# Patient Record
Sex: MALE | Race: WHITE | NOT HISPANIC OR LATINO | Employment: OTHER | URBAN - METROPOLITAN AREA
[De-identification: names, ages, dates, MRNs, and addresses within clinical notes are randomized per-mention and may not be internally consistent; named-entity substitution may affect disease eponyms.]

---

## 2017-06-18 PROBLEM — F10.939 ALCOHOL WITHDRAWAL: Status: ACTIVE | Noted: 2017-06-18

## 2017-06-19 PROBLEM — D69.6 THROMBOCYTOPENIA: Status: ACTIVE | Noted: 2017-06-19

## 2017-06-19 PROBLEM — F11.93 OPIOID WITHDRAWAL: Status: ACTIVE | Noted: 2017-06-19

## 2017-06-19 PROBLEM — E83.42 HYPOMAGNESEMIA: Status: ACTIVE | Noted: 2017-06-19

## 2017-06-19 PROBLEM — E87.1 HYPONATREMIA: Status: ACTIVE | Noted: 2017-06-19

## 2017-06-19 PROBLEM — R79.89 ELEVATED LFTS: Status: ACTIVE | Noted: 2017-06-19

## 2017-06-19 PROBLEM — R82.90 ABNORMAL URINALYSIS: Status: ACTIVE | Noted: 2017-06-19

## 2017-06-19 PROBLEM — E87.6 HYPOKALEMIA: Status: ACTIVE | Noted: 2017-06-19

## 2017-06-20 PROBLEM — E87.1 HYPONATREMIA: Status: RESOLVED | Noted: 2017-06-19 | Resolved: 2017-06-20

## 2017-06-20 PROBLEM — E83.42 HYPOMAGNESEMIA: Status: RESOLVED | Noted: 2017-06-19 | Resolved: 2017-06-20

## 2017-10-22 PROBLEM — R63.4 WEIGHT LOSS: Status: ACTIVE | Noted: 2017-10-22

## 2017-10-22 PROBLEM — R11.0 NAUSEA: Status: ACTIVE | Noted: 2017-10-22

## 2017-10-22 PROBLEM — R07.9 CHEST PAIN: Status: ACTIVE | Noted: 2017-10-22

## 2017-10-22 PROBLEM — R63.0 ANOREXIA: Status: ACTIVE | Noted: 2017-10-22

## 2017-12-19 ENCOUNTER — HOSPITAL ENCOUNTER (OUTPATIENT)
Dept: PREADMISSION TESTING | Facility: OTHER | Age: 42
Discharge: HOME | End: 2017-12-19
Attending: OTOLARYNGOLOGY
Payer: MEDICARE

## 2017-12-19 ENCOUNTER — ANESTHESIA EVENT (OUTPATIENT)
Dept: SURGERY | Facility: OTHER | Age: 42
End: 2017-12-19
Payer: MEDICARE

## 2017-12-19 VITALS
OXYGEN SATURATION: 98 % | DIASTOLIC BLOOD PRESSURE: 92 MMHG | BODY MASS INDEX: 28.88 KG/M2 | HEART RATE: 77 BPM | RESPIRATION RATE: 16 BRPM | SYSTOLIC BLOOD PRESSURE: 136 MMHG | TEMPERATURE: 98 F | HEIGHT: 69 IN | WEIGHT: 195 LBS

## 2017-12-19 RX ORDER — LIDOCAINE HYDROCHLORIDE 10 MG/ML
1 INJECTION, SOLUTION EPIDURAL; INFILTRATION; INTRACAUDAL; PERINEURAL ONCE
Status: CANCELLED | OUTPATIENT
Start: 2017-12-19 | End: 2017-12-19

## 2017-12-19 RX ORDER — PREGABALIN 75 MG/1
150 CAPSULE ORAL
Status: DISCONTINUED | OUTPATIENT
Start: 2017-12-20 | End: 2017-12-20 | Stop reason: HOSPADM

## 2017-12-19 RX ORDER — SODIUM CHLORIDE, SODIUM LACTATE, POTASSIUM CHLORIDE, CALCIUM CHLORIDE 600; 310; 30; 20 MG/100ML; MG/100ML; MG/100ML; MG/100ML
INJECTION, SOLUTION INTRAVENOUS CONTINUOUS
Status: CANCELLED | OUTPATIENT
Start: 2017-12-19

## 2017-12-19 RX ORDER — FAMOTIDINE 20 MG/1
20 TABLET, FILM COATED ORAL
Status: CANCELLED | OUTPATIENT
Start: 2017-12-19 | End: 2017-12-19

## 2017-12-19 RX ORDER — MIDAZOLAM HYDROCHLORIDE 5 MG/ML
5 INJECTION INTRAMUSCULAR; INTRAVENOUS
Status: DISCONTINUED | OUTPATIENT
Start: 2017-12-20 | End: 2017-12-20 | Stop reason: HOSPADM

## 2017-12-19 NOTE — ANESTHESIA PREPROCEDURE EVALUATION
12/19/2017  Harman Tanner Jr. is a 42 y.o., male.    Anesthesia Evaluation    I have reviewed the Patient Summary Reports.    I have reviewed the Nursing Notes.   I have reviewed the Medications.     Review of Systems  Anesthesia Hx:  No problems with previous Anesthesia  History of prior surgery of interest to airway management or planning: cervical fusion. Previous anesthesia: General Mult prev GA NAAC with general anesthesia.  Denies Family Hx of Anesthesia complications.   Denies Personal Hx of Anesthesia complications.   Social:  Non-Smoker    Hematology/Oncology:  Hematology Normal   Oncology Normal     EENT/Dental:EENT/Dental Normal   Cardiovascular:   Exercise tolerance: good    Pulmonary:  Pulmonary Normal    Renal/:  Renal/ Normal     Hepatic/GI:   GERD, well controlled    Musculoskeletal:   Arthritis   Spine Disorders: cervical and lumbar Degenerative disease, Disc disease and Chronic Pain    Neurological:   Seizures No seizure in 10 yrs   Endocrine:  Endocrine Normal    Dermatological:  Skin Normal    Psych:   Psychiatric History          Physical Exam  General:  Well nourished    Airway/Jaw/Neck:  Airway Findings: Mouth Opening: Normal Tongue: Normal  Mallampati: II     Eyes/Ears/Nose:  Eyes/Ears/Nose Findings: FX orbit    Dental:  Dental Findings: Periodontal disease, Severe, molar caps         Mental Status:  Mental Status Findings:  Cooperative, Alert and Oriented         Anesthesia Plan  Type of Anesthesia, risks & benefits discussed:  Anesthesia Type:  general  Patient's Preference:   Intra-op Monitoring Plan:   Intra-op Monitoring Plan Comments:   Post Op Pain Control Plan:   Post Op Pain Control Plan Comments:   Induction:   IV  Beta Blocker:         Informed Consent: Patient understands risks and agrees with Anesthesia plan.  Questions answered. Anesthesia consent signed  with patient.  ASA Score: 2     Day of Surgery Review of History & Physical:    H&P update referred to the surgeon.     Anesthesia Plan Notes: Labs in epic,awful teeth,metal in upper front bad        Ready For Surgery From Anesthesia Perspective.

## 2017-12-19 NOTE — DISCHARGE INSTRUCTIONS
PRE-ADMIT TESTING -  [phone]    [address]  [place] [facility]          Your surgery has been scheduled at [facility]. We are pleased to have the opportunity to serve you. For Further Information please call [phone].    On the day of surgery please report to the Information Desk on the 1st floor.    · CONTACT YOUR PHYSICIAN'S OFFICE THE DAY PRIOR TO YOUR SURGERY TO OBTAIN YOUR ARRIVAL TIME.     · The evening before surgery do not eat anything after 9 p.m. ( this includes hard candy, chewing gum and mints).  You may only have GATORADE, POWERADE AND WATER  from 9 p.m. until you leave your home.   DO NOT DRINK ANY LIQUIDS ON THE WAY TO THE HOSPITAL.      SPECIAL MEDICATION INSTRUCTIONS: TAKE medications checked off by the Anesthesiologist on your Medication List.    Angiogram Patients: Take medications as instructed by your physician, including aspirin.     Surgery Patients:    If you take ASPIRIN - Your PHYSICIAN/SURGEON will need to inform you IF/OR when you need to stop taking aspirin prior to your surgery.     Do Not take any medications containing IBUPROFEN.  Do Not Wear any make-up or dark nail polish   (especially eye make-up) to surgery. If you come to surgery with makeup on you will be required to remove the makeup or nail polish.    Do not shave your surgical area at least 5 days prior to your surgery. The surgical prep will be performed at the hospital according to Infection Control regulations.    Leave all valuables at home.   Do Not wear any jewelry or watches, including any metal in body piercings.  Contact Lens must be removed before surgery. Either do not wear the contact lens or bring a case and solution for storage.  Please bring a container for eyeglasses or dentures as required.  Bring any paperwork your physician has provided, such as consent forms,  history and physicals, doctor's orders, etc.   Bring comfortable clothes that are loose fitting to wear upon  discharge. Take into consideration the type of surgery being performed.  Maintain your diet as advised per your physician the day prior to surgery.      Adequate rest the night before surgery is advised.   Park in the Parking lot behind the hospital or in the [facility] across the street from the parking lot. Parking is complimentary.  If you will be discharged the same day as your procedure, please arrange for a responsible adult to drive you home or to accompany you if traveling by taxi.   YOU WILL NOT BE PERMITTED TO DRIVE OR TO LEAVE THE HOSPITAL ALONE AFTER SURGERY.   It is strongly recommended that you arrange for someone to remain with you for the first 24 hrs following your surgery.       Thank you for your cooperation.  The Staff of [facility].        Bathing Instructions                                                                 Please shower the evening before and morning of your procedure with    ANTIBACTERIAL SOAP. ( DIAL, etc )  Concentrate on the surgical area   for at least 3 minutes and rinse completely. Dry off as usual.   Do not use any deodorant, powder, body lotions, perfume, after shave or    cologne.

## 2017-12-20 ENCOUNTER — ANESTHESIA (OUTPATIENT)
Dept: SURGERY | Facility: OTHER | Age: 42
End: 2017-12-20
Payer: MEDICARE

## 2017-12-20 ENCOUNTER — HOSPITAL ENCOUNTER (OUTPATIENT)
Facility: OTHER | Age: 42
Discharge: HOME | End: 2017-12-20
Attending: OTOLARYNGOLOGY | Admitting: OTOLARYNGOLOGY
Payer: MEDICARE

## 2017-12-20 VITALS
SYSTOLIC BLOOD PRESSURE: 137 MMHG | DIASTOLIC BLOOD PRESSURE: 96 MMHG | TEMPERATURE: 99 F | OXYGEN SATURATION: 99 % | WEIGHT: 195 LBS | RESPIRATION RATE: 16 BRPM | BODY MASS INDEX: 28.88 KG/M2 | HEART RATE: 79 BPM | HEIGHT: 69 IN

## 2017-12-20 DIAGNOSIS — S02.32XG: Primary | ICD-10-CM

## 2017-12-20 DIAGNOSIS — S02.32XA: ICD-10-CM

## 2017-12-20 PROCEDURE — 71000033 HC RECOVERY, INTIAL HOUR: Performed by: OTOLARYNGOLOGY

## 2017-12-20 PROCEDURE — 63600175 PHARM REV CODE 636 W HCPCS: Performed by: NURSE ANESTHETIST, CERTIFIED REGISTERED

## 2017-12-20 PROCEDURE — 63600175 PHARM REV CODE 636 W HCPCS: Performed by: OTOLARYNGOLOGY

## 2017-12-20 PROCEDURE — 25000003 PHARM REV CODE 250: Performed by: NURSE ANESTHETIST, CERTIFIED REGISTERED

## 2017-12-20 PROCEDURE — 27201423 OPTIME MED/SURG SUP & DEVICES STERILE SUPPLY: Performed by: OTOLARYNGOLOGY

## 2017-12-20 PROCEDURE — 36000711: Performed by: OTOLARYNGOLOGY

## 2017-12-20 PROCEDURE — 71000039 HC RECOVERY, EACH ADD'L HOUR: Performed by: OTOLARYNGOLOGY

## 2017-12-20 PROCEDURE — 25000003 PHARM REV CODE 250: Performed by: OTOLARYNGOLOGY

## 2017-12-20 PROCEDURE — C1713 ANCHOR/SCREW BN/BN,TIS/BN: HCPCS | Performed by: OTOLARYNGOLOGY

## 2017-12-20 PROCEDURE — 71000015 HC POSTOP RECOV 1ST HR: Performed by: OTOLARYNGOLOGY

## 2017-12-20 PROCEDURE — 37000009 HC ANESTHESIA EA ADD 15 MINS: Performed by: OTOLARYNGOLOGY

## 2017-12-20 PROCEDURE — 25000003 PHARM REV CODE 250: Performed by: ANESTHESIOLOGY

## 2017-12-20 PROCEDURE — 37000008 HC ANESTHESIA 1ST 15 MINUTES: Performed by: OTOLARYNGOLOGY

## 2017-12-20 PROCEDURE — 36000710: Performed by: OTOLARYNGOLOGY

## 2017-12-20 PROCEDURE — 63600175 PHARM REV CODE 636 W HCPCS: Performed by: ANESTHESIOLOGY

## 2017-12-20 DEVICE — IMPLANTABLE DEVICE: Type: IMPLANTABLE DEVICE | Site: FACE | Status: FUNCTIONAL

## 2017-12-20 RX ORDER — OXYCODONE HYDROCHLORIDE 5 MG/1
5 TABLET ORAL
Status: DISCONTINUED | OUTPATIENT
Start: 2017-12-20 | End: 2017-12-20 | Stop reason: HOSPADM

## 2017-12-20 RX ORDER — ACETAMINOPHEN 10 MG/ML
INJECTION, SOLUTION INTRAVENOUS
Status: DISCONTINUED | OUTPATIENT
Start: 2017-12-20 | End: 2017-12-20

## 2017-12-20 RX ORDER — LIDOCAINE HYDROCHLORIDE AND EPINEPHRINE 10; 10 MG/ML; UG/ML
INJECTION, SOLUTION INFILTRATION; PERINEURAL
Status: DISCONTINUED | OUTPATIENT
Start: 2017-12-20 | End: 2017-12-20 | Stop reason: HOSPADM

## 2017-12-20 RX ORDER — HYDROCODONE BITARTRATE AND ACETAMINOPHEN 5; 325 MG/1; MG/1
2 TABLET ORAL EVERY 6 HOURS PRN
Qty: 30 TABLET | Refills: 0 | Status: SHIPPED | OUTPATIENT
Start: 2017-12-20 | End: 2020-06-20

## 2017-12-20 RX ORDER — SODIUM CHLORIDE 0.9 % (FLUSH) 0.9 %
3 SYRINGE (ML) INJECTION
Status: DISCONTINUED | OUTPATIENT
Start: 2017-12-20 | End: 2017-12-20 | Stop reason: HOSPADM

## 2017-12-20 RX ORDER — CEPHALEXIN 500 MG/1
500 CAPSULE ORAL
Qty: 30 CAPSULE | Refills: 1 | Status: SHIPPED | OUTPATIENT
Start: 2017-12-20 | End: 2017-12-30

## 2017-12-20 RX ORDER — FAMOTIDINE 20 MG/1
20 TABLET, FILM COATED ORAL
Status: COMPLETED | OUTPATIENT
Start: 2017-12-20 | End: 2017-12-20

## 2017-12-20 RX ORDER — ONDANSETRON 8 MG/1
8 TABLET, ORALLY DISINTEGRATING ORAL EVERY 8 HOURS PRN
Qty: 6 TABLET | Refills: 1 | Status: SHIPPED | OUTPATIENT
Start: 2017-12-20 | End: 2018-10-07

## 2017-12-20 RX ORDER — BACITRACIN ZINC AND POLYMYXIN B SULFATE 500; 10000 [USP'U]/G; [USP'U]/G
OINTMENT OPHTHALMIC 3 TIMES DAILY
Qty: 1 TUBE | Refills: 2 | Status: SHIPPED | OUTPATIENT
Start: 2017-12-20 | End: 2018-10-07

## 2017-12-20 RX ORDER — NEOSTIGMINE METHYLSULFATE 1 MG/ML
INJECTION, SOLUTION INTRAVENOUS
Status: DISCONTINUED | OUTPATIENT
Start: 2017-12-20 | End: 2017-12-20

## 2017-12-20 RX ORDER — MIDAZOLAM HYDROCHLORIDE 1 MG/ML
INJECTION INTRAMUSCULAR; INTRAVENOUS
Status: DISCONTINUED | OUTPATIENT
Start: 2017-12-20 | End: 2017-12-20

## 2017-12-20 RX ORDER — GLYCOPYRROLATE 0.2 MG/ML
INJECTION INTRAMUSCULAR; INTRAVENOUS
Status: DISCONTINUED | OUTPATIENT
Start: 2017-12-20 | End: 2017-12-20

## 2017-12-20 RX ORDER — FENTANYL CITRATE 50 UG/ML
INJECTION, SOLUTION INTRAMUSCULAR; INTRAVENOUS
Status: DISCONTINUED | OUTPATIENT
Start: 2017-12-20 | End: 2017-12-20

## 2017-12-20 RX ORDER — LIDOCAINE HCL/PF 100 MG/5ML
SYRINGE (ML) INTRAVENOUS
Status: DISCONTINUED | OUTPATIENT
Start: 2017-12-20 | End: 2017-12-20

## 2017-12-20 RX ORDER — KETOROLAC TROMETHAMINE 30 MG/ML
INJECTION, SOLUTION INTRAMUSCULAR; INTRAVENOUS
Status: DISCONTINUED | OUTPATIENT
Start: 2017-12-20 | End: 2017-12-20

## 2017-12-20 RX ORDER — DEXAMETHASONE SODIUM PHOSPHATE 4 MG/ML
INJECTION, SOLUTION INTRA-ARTICULAR; INTRALESIONAL; INTRAMUSCULAR; INTRAVENOUS; SOFT TISSUE
Status: DISCONTINUED | OUTPATIENT
Start: 2017-12-20 | End: 2017-12-20

## 2017-12-20 RX ORDER — FENTANYL CITRATE 50 UG/ML
25 INJECTION, SOLUTION INTRAMUSCULAR; INTRAVENOUS EVERY 5 MIN PRN
Status: COMPLETED | OUTPATIENT
Start: 2017-12-20 | End: 2017-12-20

## 2017-12-20 RX ORDER — HYDROCODONE BITARTRATE AND ACETAMINOPHEN 5; 325 MG/1; MG/1
1 TABLET ORAL EVERY 4 HOURS PRN
Status: DISCONTINUED | OUTPATIENT
Start: 2017-12-20 | End: 2017-12-20 | Stop reason: HOSPADM

## 2017-12-20 RX ORDER — MEPERIDINE HYDROCHLORIDE 50 MG/ML
12.5 INJECTION INTRAMUSCULAR; INTRAVENOUS; SUBCUTANEOUS ONCE AS NEEDED
Status: DISCONTINUED | OUTPATIENT
Start: 2017-12-20 | End: 2017-12-20 | Stop reason: HOSPADM

## 2017-12-20 RX ORDER — ROCURONIUM BROMIDE 10 MG/ML
INJECTION, SOLUTION INTRAVENOUS
Status: DISCONTINUED | OUTPATIENT
Start: 2017-12-20 | End: 2017-12-20

## 2017-12-20 RX ORDER — ONDANSETRON 2 MG/ML
INJECTION INTRAMUSCULAR; INTRAVENOUS
Status: DISCONTINUED | OUTPATIENT
Start: 2017-12-20 | End: 2017-12-20

## 2017-12-20 RX ORDER — ONDANSETRON 2 MG/ML
4 INJECTION INTRAMUSCULAR; INTRAVENOUS DAILY PRN
Status: DISCONTINUED | OUTPATIENT
Start: 2017-12-20 | End: 2017-12-20 | Stop reason: HOSPADM

## 2017-12-20 RX ORDER — ONDANSETRON 8 MG/1
8 TABLET, ORALLY DISINTEGRATING ORAL EVERY 8 HOURS PRN
Status: DISCONTINUED | OUTPATIENT
Start: 2017-12-20 | End: 2017-12-20 | Stop reason: HOSPADM

## 2017-12-20 RX ORDER — HYDROMORPHONE HYDROCHLORIDE 2 MG/ML
0.4 INJECTION, SOLUTION INTRAMUSCULAR; INTRAVENOUS; SUBCUTANEOUS EVERY 5 MIN PRN
Status: DISCONTINUED | OUTPATIENT
Start: 2017-12-20 | End: 2017-12-20 | Stop reason: HOSPADM

## 2017-12-20 RX ORDER — EPINEPHRINE 1 MG/ML
INJECTION, SOLUTION INTRACARDIAC; INTRAMUSCULAR; INTRAVENOUS; SUBCUTANEOUS
Status: DISCONTINUED | OUTPATIENT
Start: 2017-12-20 | End: 2017-12-20 | Stop reason: HOSPADM

## 2017-12-20 RX ORDER — LIDOCAINE HYDROCHLORIDE 10 MG/ML
1 INJECTION, SOLUTION EPIDURAL; INFILTRATION; INTRACAUDAL; PERINEURAL ONCE
Status: DISCONTINUED | OUTPATIENT
Start: 2017-12-20 | End: 2017-12-20 | Stop reason: HOSPADM

## 2017-12-20 RX ORDER — PROPOFOL 10 MG/ML
VIAL (ML) INTRAVENOUS
Status: DISCONTINUED | OUTPATIENT
Start: 2017-12-20 | End: 2017-12-20

## 2017-12-20 RX ORDER — SODIUM CHLORIDE, SODIUM LACTATE, POTASSIUM CHLORIDE, CALCIUM CHLORIDE 600; 310; 30; 20 MG/100ML; MG/100ML; MG/100ML; MG/100ML
INJECTION, SOLUTION INTRAVENOUS CONTINUOUS
Status: DISCONTINUED | OUTPATIENT
Start: 2017-12-20 | End: 2017-12-20 | Stop reason: HOSPADM

## 2017-12-20 RX ADMIN — FENTANYL CITRATE 50 MCG: 50 INJECTION, SOLUTION INTRAMUSCULAR; INTRAVENOUS at 10:12

## 2017-12-20 RX ADMIN — FENTANYL CITRATE 25 MCG: 50 INJECTION, SOLUTION INTRAMUSCULAR; INTRAVENOUS at 11:12

## 2017-12-20 RX ADMIN — CARBOXYMETHYLCELLULOSE SODIUM 2 DROP: 2.5 SOLUTION/ DROPS OPHTHALMIC at 09:12

## 2017-12-20 RX ADMIN — ROCURONIUM BROMIDE 50 MG: 10 INJECTION, SOLUTION INTRAVENOUS at 09:12

## 2017-12-20 RX ADMIN — SODIUM CHLORIDE, SODIUM LACTATE, POTASSIUM CHLORIDE, AND CALCIUM CHLORIDE: 600; 310; 30; 20 INJECTION, SOLUTION INTRAVENOUS at 08:12

## 2017-12-20 RX ADMIN — GLYCOPYRROLATE 0.8 MG: 0.2 INJECTION, SOLUTION INTRAMUSCULAR; INTRAVENOUS at 10:12

## 2017-12-20 RX ADMIN — LIDOCAINE HYDROCHLORIDE 100 MG: 20 INJECTION, SOLUTION INTRAVENOUS at 09:12

## 2017-12-20 RX ADMIN — HYDROMORPHONE HYDROCHLORIDE 0.4 MG: 2 INJECTION INTRAMUSCULAR; INTRAVENOUS; SUBCUTANEOUS at 11:12

## 2017-12-20 RX ADMIN — FENTANYL CITRATE 50 MCG: 50 INJECTION, SOLUTION INTRAMUSCULAR; INTRAVENOUS at 11:12

## 2017-12-20 RX ADMIN — KETOROLAC TROMETHAMINE 30 MG: 30 INJECTION, SOLUTION INTRAMUSCULAR; INTRAVENOUS at 10:12

## 2017-12-20 RX ADMIN — PROPOFOL 200 MG: 10 INJECTION, EMULSION INTRAVENOUS at 09:12

## 2017-12-20 RX ADMIN — HYDROMORPHONE HYDROCHLORIDE 0.4 MG: 2 INJECTION INTRAMUSCULAR; INTRAVENOUS; SUBCUTANEOUS at 12:12

## 2017-12-20 RX ADMIN — MIDAZOLAM HYDROCHLORIDE 2 MG: 1 INJECTION, SOLUTION INTRAMUSCULAR; INTRAVENOUS at 10:12

## 2017-12-20 RX ADMIN — FAMOTIDINE 20 MG: 20 TABLET, FILM COATED ORAL at 07:12

## 2017-12-20 RX ADMIN — DEXAMETHASONE SODIUM PHOSPHATE 8 MG: 4 INJECTION, SOLUTION INTRAMUSCULAR; INTRAVENOUS at 09:12

## 2017-12-20 RX ADMIN — FENTANYL CITRATE 100 MCG: 50 INJECTION, SOLUTION INTRAMUSCULAR; INTRAVENOUS at 09:12

## 2017-12-20 RX ADMIN — DEXTROSE 2 G: 50 INJECTION, SOLUTION INTRAVENOUS at 09:12

## 2017-12-20 RX ADMIN — ONDANSETRON 4 MG: 2 INJECTION INTRAMUSCULAR; INTRAVENOUS at 10:12

## 2017-12-20 RX ADMIN — MIDAZOLAM HYDROCHLORIDE 2 MG: 1 INJECTION, SOLUTION INTRAMUSCULAR; INTRAVENOUS at 09:12

## 2017-12-20 RX ADMIN — ACETAMINOPHEN 1000 MG: 10 INJECTION, SOLUTION INTRAVENOUS at 09:12

## 2017-12-20 RX ADMIN — OXYCODONE HYDROCHLORIDE 5 MG: 5 TABLET ORAL at 11:12

## 2017-12-20 RX ADMIN — NEOSTIGMINE METHYLSULFATE 5 MG: 1 INJECTION INTRAVENOUS at 10:12

## 2017-12-20 NOTE — PLAN OF CARE
Patient prefers to have *[facility] [phone] present for discharge.

## 2017-12-20 NOTE — OR NURSING
Harman Tanner Jr. has met all discharge criteria from Phase II. Vital Signs are stable, ambulating  without difficulty. Discharge instructions given, patient verbalized understanding. Discharged from facility via wheelchair in stable condition.

## 2017-12-20 NOTE — BRIEF OP NOTE
Operative Note     SUMMARY     Surgery Date: 12/20/2017     Surgeon(s) and Role:     * Cedric Mosher MD - Primary     * Silas Robin MD - Assisting    Pre-op Diagnosis:  Closed blow-out fracture of left orbit, initial encounter [S02.32XA]    Post-op Diagnosis: Closed blow-out fracture of left orbit, initial encounter [S02.32XA]    Procedure(s) (LRB):  OPEN REDUCTION INTERNAL FIXATION-ORBITAL FRACTURE (Left)    Anesthesia: General    Findings/Key Components:  Dictated    Estimated Blood Loss: Minimal         Specimens     None            Discharge Note      SUMMARY     Admit Date: 12/20/2017    Attending Physician: Cedric Mosher, *     Discharge Physician: Cedric Mosher, *    Discharge Date: 12/20/2017    Final Diagnosis: Closed blow-out fracture of left orbit, initial encounter [S02.32XA]    Disposition: Home or Self Care    Patient Instructions:   Current Discharge Medication List      START taking these medications    Details   cephALEXin (KEFLEX) 500 MG capsule Take 1 capsule (500 mg total) by mouth 3 (three) times daily with meals.  Qty: 30 capsule, Refills: 1      hydrocodone-acetaminophen 5-325mg (NORCO) 5-325 mg per tablet Take 2 tablets by mouth every 6 (six) hours as needed for Pain.  Qty: 30 tablet, Refills: 0      ondansetron (ZOFRAN-ODT) 8 MG TbDL Take 1 tablet (8 mg total) by mouth every 8 (eight) hours as needed.  Qty: 6 tablet, Refills: 1         CONTINUE these medications which have NOT CHANGED    Details   atropine 1% (ISOPTO ATROPINE) 1 % Drop Place 1 drop into the left eye 2 (two) times daily.  Qty: 5 mL, Refills: 0      clonazePAM (KLONOPIN) 1 MG tablet Take 1 mg by mouth 2 (two) times daily as needed for Anxiety.      diphenhydrAMINE (BENADRYL) 25 mg capsule Take 1 each (25 mg total) by mouth every 6 (six) hours as needed for Itching or Allergies.  Qty: 20 capsule, Refills: 0      famotidine (PEPCID) 20 MG tablet Take 1 tablet (20 mg total) by mouth 2 (two) times  daily.  Qty: 60 tablet, Refills: 0      metoclopramide HCl (REGLAN) 10 MG tablet Take 1 tablet (10 mg total) by mouth every 6 (six) hours as needed (nausea).  Qty: 24 tablet, Refills: 0      ondansetron (ZOFRAN) 4 MG tablet Take 1 tablet (4 mg total) by mouth every 8 (eight) hours as needed.  Qty: 12 tablet, Refills: 0             Discharge Procedure Orders (must include Diet, Follow-up, Activity)    Discharge Procedure Orders (must include Diet, Follow-up, Activity)  Diet general   Order Comments: Advance diet as tolerated.     Activity as tolerated   Order Comments: Sleep head of bed elevated for 72hrs  No heavy lifting  No nose blowing  No strenuous activity  No driving while on pain medications     Call MD for:  temperature >100.4     Call MD for:  persistent nausea and vomiting     Call MD for:  severe uncontrolled pain     Call MD for:  difficulty breathing, headache or visual disturbances     Wound care routine (specify)   Order Comments: Wound care routine: clean incision and apply antibiotic ointment BID

## 2017-12-20 NOTE — DISCHARGE INSTRUCTIONS

## 2017-12-20 NOTE — OP NOTE
DATE OF PROCEDURE:  12/20/2017    PREOPERATIVE DIAGNOSIS:  Left orbital blowout fracture.    POSTOPERATIVE DIAGNOSIS:  Left orbital blowout fracture.    PROCEDURES PERFORMED:  Open repair of left orbital floor fracture.    SURGEON:  Dr. Mosher.    BLOOD LOSS:  Minimal.    COMPLICATIONS:  None.    ANESTHETIC:  General endotracheal.    OPERATIVE FINDINGS:  Large defect left orbital floor, medial and posterior,   repaired with Synthes small preformed titanium plate.    INDICATIONS:  Mr. Tanner is 42 years old who was referred to our office in   [place] for evaluation of orbital blowout fracture.  He was referred by the   ophthalmologist who was found him for significant orbital trauma.  He has a   significant amount of issues with blood in his eye and decreased vision.  In   addition, he had some restriction of motion and some double vision with extreme   gaze.  He had no obvious retraction or restriction of the inferior medial rectus   muscle on physical examination.  However, he did have some significant   enophthalmos and depression of the globe.  It was recommending he undergo a   repair due to significant defect in the floor, which was easily greater than 1   cm in size.  An informed operative consent was obtained.    OPERATIVE PROCEDURE:  He was taken to the operating room, placed under general   anesthesia.  Local anesthetic was infiltrated into the left orbital rim and the   lateral orbit.  Corneal shield was placed on the patient's eye.  A #15 blade was   used to make a subciliary incision along the zygoma, just lateral to the   Crow's-feet line.  The dissection proceeded through the orbicularis muscle down   to the periosteum laterally.  Starting there and transversing medially, skin and   soft tissue muscle flap was elevated with a pair of Quiñonez scissors.  The skin   and soft tissue muscle flap was retracted with eyelid retractors.  Care was   taken to prevent injury of the orbital septum, which was kept  intact.  The   orbital septum was retracted with periosteal elevator.  Dissection proceeded   from lateral to the medial orbit.  At that point, the periosteum was incised   with a #15 blade and under direct visualization using suction magnification and   a [place], the periosteum was elevated along the orbital floor.    The periosteum was elevated laterally and posteriorly along normal orbital floor   bone.  Then, it was carried medially and posteriorly to find stable bone, which   was found at about 31 mm deep.  The herniated contents were then carefully   elevated up using direct visualization using the periosteal elevator as well as   the series of malleable retractors to elevate the prolapsed orbital contents.    At that point, there was a measurement taken and the decision was made to use   the small preformed Synthes orbital floor plate.  One of the retention plate   holes were removed, so that one plate was bent over the orbital rim to secure   the titanium mesh once it was inserted.  Under direct visualization using 2   malleables to elevate the globe superiorly, the orbital implant was placed into   the floor and then orbit making sure there was stable bone medial, lateral and   posterior to the implant itself.  Once the plate was placed in direct position   under direct visualization, it was secured with a 4-mm screw, self-tapping or   self-drilling directly over the orbital rim.  At that point, a forced duction   test was obtained and there was no restriction of motion superiorly, inferiorly,   medially or laterally.  The orbital contents were again checked under direct   visualization with the malleables and [place].  There was good placement of the   plate and at that point that part of the procedure was terminated.  The skin   muscle soft tissue flap was reapproximated in 3 places using a 6-0 fast   absorbing gut.  The lateral incision was closed under direct visualization using   Vicryl to suspend the  skin, soft tissue and muscle flap up superiorly to the   lateral zygoma.  The skin was then closed with the 6-0 fast absorbing gut as   well.  At that point, the patient was brought out of the anesthetic.  He was   extubated and brought to Recovery in stable condition.      RUSS/ALEXANDRA  dd: 12/20/2017 11:31:29 (CST)  td: 12/20/2017 12:42:39 (CST)  Doc ID   #[id number]  Job ID #[id number]    CC:

## 2017-12-20 NOTE — H&P
[facility]  Otorhinolaryngology-Head & Neck Surgery  History & Physical    Patient Name: Harman Tanner Jr.  MRN: [id number]  Admission Date: 12/20/2017  Attending Physician: Cedric Mosher MD  Primary Care Provider: Kingsley Wallace MD    Patient information was obtained from patient and ER records.     Subjective:     Chief Complaint/Reason for Admission: left orbital fracture    History of Present Illness:  42 y.o. male presents with trauma left orbit in November 21, fell striking left eye and face    No current facility-administered medications on file prior to encounter.      Current Outpatient Prescriptions on File Prior to Encounter   Medication Sig    atropine 1% (ISOPTO ATROPINE) 1 % Drop Place 1 drop into the left eye 2 (two) times daily.    clonazePAM (KLONOPIN) 1 MG tablet Take 1 mg by mouth 2 (two) times daily as needed for Anxiety.    diphenhydrAMINE (BENADRYL) 25 mg capsule Take 1 each (25 mg total) by mouth every 6 (six) hours as needed for Itching or Allergies.    famotidine (PEPCID) 20 MG tablet Take 1 tablet (20 mg total) by mouth 2 (two) times daily.    metoclopramide HCl (REGLAN) 10 MG tablet Take 1 tablet (10 mg total) by mouth every 6 (six) hours as needed (nausea).    ondansetron (ZOFRAN) 4 MG tablet Take 1 tablet (4 mg total) by mouth every 8 (eight) hours as needed.       Review of patient's allergies indicates:  No Known Allergies    Past Medical History:   Diagnosis Date    Bipolar 1 disorder     Depression     Lumbar herniated disc     Nausea & vomiting     Seizures     Vomiting      Past Surgical History:   Procedure Laterality Date    APPENDECTOMY      BACK SURGERY      cervical    CERVICAL FUSION      HERNIA REPAIR      LEG SURGERY Left     for compartment syndrome    LEG SURGERY      SHOULDER SURGERY Bilateral     TONSILLECTOMY       Family History     None        Social History Main Topics    Smoking status: Never Smoker     Smokeless tobacco: Never Used    Alcohol use Yes      Comment: 1 pints 3-4 times a week    Drug use: Yes     Frequency: 3.0 times per week     Types: Marijuana      Comment: opiods    Sexual activity: Yes     Partners: Female     Review of Systems  Objective:     Vital Signs (Most Recent):  Temp: 98 °F (36.7 °C) (12/20/17 0715)  Pulse: 98 (12/20/17 0715)  Resp: 16 (12/20/17 0715)  BP: (!) 152/94 (12/20/17 0715)  SpO2: (!) 94 % (12/20/17 0715) Vital Signs (24h Range):  Temp:  [98 °F (36.7 °C)-98.4 °F (36.9 °C)] 98 °F (36.7 °C)  Pulse:  [77-98] 98  Resp:  [16] 16  SpO2:  [94 %-98 %] 94 %  BP: (136-152)/(92-94) 152/94     Weight: 88.5 kg (195 lb)  Body mass index is 28.8 kg/m².    Physical Exam   Awake and alert  Vitals stable  Left globe with enopthalmus, some upward restriction, left pupil dilated  Nose and throat clear  Ears clear  Nose clear  Poor dentition  Neck without masses  Chest clear  Heart regular  Abdomen soft  Extremities left lower extremity skin graft 2 to compartment syndrome    Significant Labs:  CBC: No results for input(s): WBC, RBC, HGB, HCT, PLT, MCV, MCH, MCHC in the last 168 hours.  CMP: No results for input(s): GLU, CALCIUM, ALBUMIN, PROT, NA, K, CO2, CL, BUN, CREATININE, ALKPHOS, ALT, AST, BILITOT in the last 168 hours.    Significant Diagnostics:  CT: I have reviewed all pertinent results/findings within the past 24 hours and my personal findings are:  left orbital floor and medial wall fracture    Assessment/Plan:     Active Diagnoses:    Diagnosis Date Noted POA    Closed blow-out fracture of left orbit [S02.32XA] 12/20/2017 Yes      Problems Resolved During this Admission:    Diagnosis Date Noted Date Resolved POA     VTE Risk Mitigation     None      for left orbital floor repair    Cedric Mosher MD  Otorhinolaryngology-Head & Neck Surgery  [facility]

## 2017-12-20 NOTE — TRANSFER OF CARE
"Anesthesia Transfer of Care Note    Patient: Harman Tanner Jr.    Procedure(s) Performed: Procedure(s) (LRB):  OPEN REDUCTION INTERNAL FIXATION-ORBITAL FRACTURE (Left)    Patient location: PACU    Anesthesia Type: general    Transport from OR: Transported from OR on room air with adequate spontaneous ventilation    Post pain: adequate analgesia    Post assessment: no apparent anesthetic complications and tolerated procedure well    Post vital signs: stable    Level of consciousness: awake, alert and oriented    Nausea/Vomiting: no nausea/vomiting    Complications: none    Transfer of care protocol was followed      Last vitals:   Visit Vitals  BP (!) 140/95 (BP Location: Right arm, Patient Position: Lying)   Pulse (!) 112   Temp 37.4 °C (99.3 °F) (Oral)   Resp 16   Ht 5' 9" (1.753 m)   Wt 88.5 kg (195 lb)   SpO2 (!) 94%   BMI 28.80 kg/m²     "

## 2017-12-20 NOTE — PLAN OF CARE
Problem: Pain, Acute (Adult)  Goal: Acceptable Pain Control/Comfort Level  Patient will demonstrate the desired outcomes by discharge/transition of care.  Outcome: Outcome(s) achieved Date Met: 12/20/17  Harman Tanner Jr. has met all discharge criteria from Phase II. Vital Signs are stable, ambulating  without difficulty.Pain is now under control and tolerable for the pt. Pain score 6 at this time.  Discharge instructions given, patient verbalized understanding. Discharged from facility via wheelchair in stable condition.

## 2017-12-20 NOTE — ANESTHESIA POSTPROCEDURE EVALUATION
"Anesthesia Post Evaluation    Patient: Harman Tanner Jr.    Procedure(s) Performed: Procedure(s) (LRB):  OPEN REDUCTION INTERNAL FIXATION-ORBITAL FRACTURE (Left)    Final Anesthesia Type: general  Patient location during evaluation: PACU  Patient participation: Yes- Able to Participate  Level of consciousness: awake and alert  Post-procedure vital signs: reviewed and stable  Pain management: adequate  Airway patency: patent  PONV status at discharge: No PONV  Anesthetic complications: no      Cardiovascular status: blood pressure returned to baseline  Respiratory status: unassisted  Hydration status: euvolemic  Follow-up not needed.        Visit Vitals  /80   Pulse 104   Temp 37.4 °C (99.3 °F) (Oral)   Resp 18   Ht 5' 9" (1.753 m)   Wt 88.5 kg (195 lb)   SpO2 96%   BMI 28.80 kg/m²       Pain/Selene Score: Pain Assessment Performed: Yes (12/20/2017 11:19 AM)  Presence of Pain: complains of pain/discomfort (12/20/2017 11:19 AM)  Pain Rating Prior to Med Admin: 8 (12/20/2017 12:00 PM)  Selene Score: 8 (12/20/2017 11:19 AM)  Modified Selene Score: 15 (12/20/2017 11:19 AM)      "

## 2018-09-11 ENCOUNTER — TELEPHONE (OUTPATIENT)
Dept: ORTHOPEDICS | Facility: CLINIC | Age: 43
End: 2018-09-11

## 2018-09-11 NOTE — TELEPHONE ENCOUNTER
----- Message from Margaret Del Toro sent at 9/11/2018 11:00 AM CDT -----  Contact: Tanya ([facility] ) [phone]  Tanya would like to speak with you about the new patient being in ED [facility] asking for consult left/ hand thumb laceration. Tanya would like the message on high alert she needs a call back before lunch. Please advise.

## 2019-02-22 ENCOUNTER — HOSPITAL ENCOUNTER (EMERGENCY)
Facility: HOSPITAL | Age: 44
Discharge: HOME | End: 2019-02-22
Attending: EMERGENCY MEDICINE
Payer: MEDICARE

## 2019-02-22 VITALS
SYSTOLIC BLOOD PRESSURE: 157 MMHG | RESPIRATION RATE: 20 BRPM | HEIGHT: 69 IN | DIASTOLIC BLOOD PRESSURE: 99 MMHG | BODY MASS INDEX: 31.84 KG/M2 | OXYGEN SATURATION: 96 % | WEIGHT: 215 LBS | HEART RATE: 89 BPM

## 2019-02-22 DIAGNOSIS — M54.2 NECK PAIN: ICD-10-CM
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
## 2019-02-22 DIAGNOSIS — G44.319 ACUTE POST-TRAUMATIC HEADACHE, NOT INTRACTABLE: ICD-10-CM

## 2019-02-22 PROCEDURE — 25000003 PHARM REV CODE 250: Performed by: STUDENT IN AN ORGANIZED HEALTH CARE EDUCATION/TRAINING PROGRAM

## 2019-02-22 PROCEDURE — 99284 EMERGENCY DEPT VISIT MOD MDM: CPT | Mod: GC,,, | Performed by: EMERGENCY MEDICINE

## 2019-02-22 PROCEDURE — 99285 EMERGENCY DEPT VISIT HI MDM: CPT | Mod: 25

## 2019-02-22 PROCEDURE — 99284 PR EMERGENCY DEPT VISIT,LEVEL IV: ICD-10-PCS | Mod: GC,,, | Performed by: EMERGENCY MEDICINE

## 2019-02-22 RX ORDER — ZOLPIDEM TARTRATE 10 MG/1
5 TABLET ORAL NIGHTLY PRN
COMMUNITY
End: 2020-06-20

## 2019-02-22 RX ORDER — ACETAMINOPHEN 500 MG
1000 TABLET ORAL
Status: COMPLETED | OUTPATIENT
Start: 2019-02-22 | End: 2019-02-22

## 2019-02-22 RX ORDER — METHOCARBAMOL 500 MG/1
500 TABLET, FILM COATED ORAL
Status: COMPLETED | OUTPATIENT
Start: 2019-02-22 | End: 2019-02-22

## 2019-02-22 RX ORDER — METHOCARBAMOL 500 MG/1
1000 TABLET, FILM COATED ORAL 3 TIMES DAILY PRN
Qty: 30 TABLET | Refills: 0 | Status: SHIPPED | OUTPATIENT
Start: 2019-02-22 | End: 2019-02-27

## 2019-02-22 RX ADMIN — ACETAMINOPHEN 1000 MG: 500 TABLET ORAL at 01:02

## 2019-02-22 RX ADMIN — METHOCARBAMOL TABLETS 500 MG: 500 TABLET, COATED ORAL at 01:02

## 2019-02-22 NOTE — ED PROVIDER NOTES
Encounter Date: 2/22/2019    SCRIBE #1 NOTE: I, Gayle Gale, am scribing for, and in the presence of,  Dr. Goldsmith. I have scribed the following portions of the note - the Resident attestation.       History     Chief Complaint   Patient presents with    Fall     Trip and fall with momentary LOC. Nck pain     The history is provided by the patient.      44yo M with PMH of bipolar disorder, HTN, previous cervical spine surgeries, lumbar disc disease, who presents with fall that occurred just prior to arrival. Patient reports tripping on an object, falling, landing flat on his face. Did hit head, and did lose consciousness for about 5 seconds. Complains of mild headache, worst in the frontal region, constant, no specific aggravating or alleviating factors. Also has pain in his neck, worse from his baseline. Has chronic mid and lower back pain, unchanged from his norm. Reports chronic urinary problems where he has the sensation to go and then must go quickly, and occasionally cannot make it to the toilet. Denies numbness or weakness of arms and legs. Denies fecal incontinence.   Reports that he saw his neurosurgeon today at [facility], who plans to do further scans and surgery. Does not take blood thinners.      Review of patient's allergies indicates:  No Known Allergies  Past Medical History:   Diagnosis Date    Bipolar 1 disorder     Depression     Lumbar herniated disc     Nausea & vomiting     Seizures     Vomiting      Past Surgical History:   Procedure Laterality Date    APPENDECTOMY      BACK SURGERY      cervical    CERVICAL FUSION      HERNIA REPAIR      LEG SURGERY Left     for compartment syndrome    LEG SURGERY      OPEN REDUCTION INTERNAL FIXATION-ORBITAL FRACTURE Left 12/20/2017    Performed by Cedric Mosher [facility] at [facility] OR    SHOULDER SURGERY Bilateral     TONSILLECTOMY       No family history on file.  Social History     Tobacco Use    Smoking status: Never Smoker     Smokeless tobacco: Never Used   Substance Use Topics    Alcohol use: Yes     Comment: 1 pints 3-4 times a week    Drug use: Yes     Frequency: 3.0 times per week     Types: Marijuana     Comment: opiods     Review of Systems   Constitutional: Negative for chills and fever.   HENT: Negative for congestion and sore throat.    Eyes: Negative for discharge and redness.   Respiratory: Negative for cough, shortness of breath and wheezing.    Cardiovascular: Negative for chest pain and leg swelling.   Gastrointestinal: Negative for abdominal pain, constipation, diarrhea, nausea and vomiting.   Genitourinary: Positive for urgency. Negative for decreased urine volume, difficulty urinating, dysuria and hematuria.   Musculoskeletal: Positive for back pain and neck pain.   Skin: Negative for rash and wound.   Neurological: Positive for headaches. Negative for dizziness, tremors, seizures, syncope, facial asymmetry, speech difficulty, weakness, light-headedness and numbness.   Psychiatric/Behavioral: Negative for behavioral problems and confusion.       Physical Exam     Initial Vitals [02/22/19 0033]   BP Pulse Resp Temp SpO2   134/72 84 18 -- 98 %      MAP       --         Physical Exam    Nursing note and vitals reviewed.  Constitutional: He appears well-developed and well-nourished. He is not diaphoretic.    male who appears stated age, lying in bed. Appears uncomfortable   HENT:   Head: Normocephalic.   Nose: Nose normal.   Mouth/Throat: Oropharynx is clear and moist. No oropharyngeal exudate.   Abrasion to forehead and right zygoma  No bony facial tenderness   Eyes: Conjunctivae and EOM are normal. Pupils are equal, round, and reactive to light.   Neck: No tracheal deviation present. No JVD present.   C collar in place  Well healed surgical scars to anterior and posterior cervical regions  No midline cervical tenderness   Cardiovascular: Normal rate, regular rhythm, normal heart sounds and intact distal pulses.  Exam reveals no gallop and no friction rub.    No murmur heard.  Pulmonary/Chest: Breath sounds normal. No stridor. No respiratory distress. He has no wheezes.   Abdominal: Soft. Bowel sounds are normal. He exhibits no distension. There is no tenderness. There is no rebound and no guarding.   Musculoskeletal: He exhibits no edema.   Paraspinal tenderness in the mid thoracic region  Paraspinal tenderness in the lower lumbar region   Neurological: He is alert and oriented to person, place, and time.   Strength 5/5 in BUE and BLE  Sensation to light touch intact in BUE and BLE, including saddle region   Skin: Skin is warm and dry. No rash noted.   Psychiatric: He has a normal mood and affect. His behavior is normal. Thought content normal.         ED Course   Procedures  Labs Reviewed - No data to display       Imaging Results          CT Head Without Contrast (In process)  Result time 02/22/19 01:51:54               CT Cervical Spine Without Contrast (In process)                  Medical Decision Making:   History:   Old Medical Records: I decided to obtain old medical records.  Clinical Tests:   Radiological Study: Ordered and Reviewed       APC / Resident Notes:   MDM  42yo M with PMH of bipolar disorder, HTN, previous cervical spine surgeries, lumbar disc disease, who presents with HA and neck pain following a fall. Vitals with elevated blood pressure but stable, exam notable for facial abrasion, paraspinal thoracic and lumbar tenderness, normal strength and sensation  DDx - intracranial hemorrhage, cervical spine fracture or hardware dislodgement, muscle strain, concussion. No midline T or L spine tenderness to suggest acute fracture. No neurologic symptoms or findings to suggest spinal cord abnormality. Urinary symptom not consistent with cauda equina  Workup - CT head and C shazia  Tx - tylenol, robaxin  H Amandeep Hanna MD  1:47 AM    CTs negative for acute changes, no evidence of fracture or hardware  displacement. Patient was asleep upon reassessment, awoke appropriately to verbal stimulation, and reported significant improvement with tylenol and robaxin. Will prescribe robaxin. Instructed patient to follow up with his neurosurgeon for further care, gave return precautions. Patient was agreeable, and he is stable for discharge.  ISSAC Hanna MD  3:16 AM         Scribe Attestation:   Scribe #1: I performed the above scribed service and the documentation accurately describes the services I performed. I attest to the accuracy of the note.    Attending Attestation:   Physician Attestation Statement for Resident:  As the supervising MD   Physician Attestation Statement: I have personally seen and examined this patient.   I agree with the above history. -: Patient fell earlier today landed on his face complaining of neck and back pain. No direct injury to the back. He has chronic back pain but is followed by an outside neurosurgeon. He has had multiple surgeries.  His low back pain and bowel incontinence are from prior to the fall today and are unchanged. Will image head and neck after fall. No new neurological findings. I anticipate discharge.   As the supervising MD I agree with the above PE.    As the supervising MD I agree with the above treatment, course, plan, and disposition.                       Clinical Impression:       ICD-10-CM ICD-9-CM   1. Fall, initial encounter W19.XXXA E888.9   2. Acute post-traumatic headache, not intractable G44.319 339.21   3. Neck pain M54.2 723.1                                Luke Hanna MD  Resident  02/22/19 1986       Dm Goldsmith MD  02/22/19 7992

## 2019-02-22 NOTE — ED NOTES
LOC: The patient is awake, alert, and oriented to place, time, situation. Affect is appropriate.  Speech is appropriate and clear. Pt denies recent use of tobacco, recreational drugs. Pt reports occasional alcohol use. Denies alcohol use today.     APPEARANCE: Patient resting comfortably in no acute distress.  Patient is clean and well groomed. Pt in c-collar placed by EMS.     SKIN: The skin is warm and dry; color consistent with ethnicity.  Patient has normal skin turgor and moist mucus membranes. Pt has small skin lacerations above and below R eye. No bleeding.     MUSCULOSKELETAL: Patient moving upper and lower extremities without difficulty.  Pt reports chronic neck and B shoulder pain. Pain worse after fall. Pt in c-collar. Pt is tender to palpate thoracic spine.     RESPIRATORY: Airway is open and patent. Respirations spontaneous, even, easy, and non-labored.  Patient has a normal effort and rate.  No accessory muscle use noted. Denies cough. Patient denies sob.    CARDIAC:  Normal rhythm and rate noted.  No peripheral edema noted. No complaints of chest pain.     ABDOMEN: Soft and non tender to palpation.  No distention noted. Patient denies v/d. Reports nausea prior to fall. No acute changes.     NEUROLOGIC: Eyes open spontaneously.  Behavior appropriate to situation.  Follows commands; facial expression symmetrical.  Purposeful motor response noted; normal sensation in all extremities. Pt reports loss of consciousness approx 2-3min after unwitnessed fall. PEARRL. Reports chronic L arm tingling/numbness with no acute changes. Reports dizziness and 10/10 headache. Denies blurry vision. Has decreased vision L eye (chronic with no acute changes).

## 2019-02-22 NOTE — ED TRIAGE NOTES
Pt took nighttime sleep aid and tripped on cement stairs. Pt fell, had loss of consciousness (approx 2-3min). Reports pain in neck and B shoulders. Pt a&ox4. Pt reports tingling/numbness in L arm. Pt reports this is chronic, but exacerbated by fall.

## 2019-02-22 NOTE — ED NOTES
Pt reports being prescribed Rx for HTN. Pt states he does not take medication, and does not know the name of it.

## 2019-07-24 ENCOUNTER — HOSPITAL ENCOUNTER (EMERGENCY)
Facility: HOSPITAL | Age: 44
Discharge: HOME | End: 2019-07-25
Attending: EMERGENCY MEDICINE
Payer: MEDICARE

## 2019-07-24 DIAGNOSIS — F43.0 ACUTE STRESS REACTION: Primary | ICD-10-CM

## 2019-07-24 DIAGNOSIS — R00.0 TACHYCARDIA: ICD-10-CM

## 2019-07-24 LAB
ALBUMIN SERPL BCP-MCNC: 4.7 G/DL (ref 3.5–5.2)
ALP SERPL-CCNC: 54 U/L (ref 55–135)
ALT SERPL W/O P-5'-P-CCNC: 37 U/L (ref 10–44)
AMPHET+METHAMPHET UR QL: NORMAL
ANION GAP SERPL CALC-SCNC: 13 MMOL/L (ref 8–16)
APAP SERPL-MCNC: <3 UG/ML (ref 10–20)
AST SERPL-CCNC: 34 U/L (ref 10–40)
BACTERIA #/AREA URNS AUTO: NORMAL /HPF
BARBITURATES UR QL SCN>200 NG/ML: NEGATIVE
BASOPHILS # BLD AUTO: 0.04 K/UL (ref 0–0.2)
BASOPHILS NFR BLD: 0.5 % (ref 0–1.9)
BENZODIAZ UR QL SCN>200 NG/ML: NEGATIVE
BILIRUB SERPL-MCNC: 0.4 MG/DL (ref 0.1–1)
BILIRUB UR QL STRIP: NEGATIVE
BUN SERPL-MCNC: 18 MG/DL (ref 6–20)
BZE UR QL SCN: NEGATIVE
CALCIUM SERPL-MCNC: 10.6 MG/DL (ref 8.7–10.5)
CANNABINOIDS UR QL SCN: NORMAL
CHLORIDE SERPL-SCNC: 106 MMOL/L (ref 95–110)
CLARITY UR REFRACT.AUTO: CLEAR
CO2 SERPL-SCNC: 25 MMOL/L (ref 23–29)
COLOR UR AUTO: YELLOW
CREAT SERPL-MCNC: 1 MG/DL (ref 0.5–1.4)
CREAT UR-MCNC: 98 MG/DL (ref 23–375)
DIFFERENTIAL METHOD: ABNORMAL
EOSINOPHIL # BLD AUTO: 0 K/UL (ref 0–0.5)
EOSINOPHIL NFR BLD: 0.5 % (ref 0–8)
ERYTHROCYTE [DISTWIDTH] IN BLOOD BY AUTOMATED COUNT: 13.6 % (ref 11.5–14.5)
EST. GFR  (AFRICAN AMERICAN): >60 ML/MIN/1.73 M^2
EST. GFR  (NON AFRICAN AMERICAN): >60 ML/MIN/1.73 M^2
ETHANOL SERPL-MCNC: <10 MG/DL
GLUCOSE SERPL-MCNC: 94 MG/DL (ref 70–110)
GLUCOSE UR QL STRIP: NEGATIVE
HCT VFR BLD AUTO: 43.2 % (ref 40–54)
HGB BLD-MCNC: 13.9 G/DL (ref 14–18)
HGB UR QL STRIP: ABNORMAL
IMM GRANULOCYTES # BLD AUTO: 0.01 K/UL (ref 0–0.04)
IMM GRANULOCYTES NFR BLD AUTO: 0.1 % (ref 0–0.5)
KETONES UR QL STRIP: NEGATIVE
LEUKOCYTE ESTERASE UR QL STRIP: NEGATIVE
LYMPHOCYTES # BLD AUTO: 1.6 K/UL (ref 1–4.8)
LYMPHOCYTES NFR BLD: 21 % (ref 18–48)
MCH RBC QN AUTO: 29.5 PG (ref 27–31)
MCHC RBC AUTO-ENTMCNC: 32.2 G/DL (ref 32–36)
MCV RBC AUTO: 92 FL (ref 82–98)
METHADONE UR QL SCN>300 NG/ML: NEGATIVE
MICROSCOPIC COMMENT: NORMAL
MONOCYTES # BLD AUTO: 0.7 K/UL (ref 0.3–1)
MONOCYTES NFR BLD: 9.6 % (ref 4–15)
NEUTROPHILS # BLD AUTO: 5.2 K/UL (ref 1.8–7.7)
NEUTROPHILS NFR BLD: 68.3 % (ref 38–73)
NITRITE UR QL STRIP: NEGATIVE
NRBC BLD-RTO: 0 /100 WBC
OPIATES UR QL SCN: NEGATIVE
PCP UR QL SCN>25 NG/ML: NEGATIVE
PH UR STRIP: 6 [PH] (ref 5–8)
PLATELET # BLD AUTO: 323 K/UL (ref 150–350)
PMV BLD AUTO: 9.4 FL (ref 9.2–12.9)
POTASSIUM SERPL-SCNC: 4.2 MMOL/L (ref 3.5–5.1)
PROT SERPL-MCNC: 8 G/DL (ref 6–8.4)
PROT UR QL STRIP: NEGATIVE
RBC # BLD AUTO: 4.71 M/UL (ref 4.6–6.2)
RBC #/AREA URNS AUTO: 2 /HPF (ref 0–4)
SODIUM SERPL-SCNC: 144 MMOL/L (ref 136–145)
SP GR UR STRIP: 1.01 (ref 1–1.03)
TOXICOLOGY INFORMATION: NORMAL
TSH SERPL DL<=0.005 MIU/L-ACNC: 1.87 UIU/ML (ref 0.4–4)
URN SPEC COLLECT METH UR: ABNORMAL
WBC # BLD AUTO: 7.63 K/UL (ref 3.9–12.7)
WBC #/AREA URNS AUTO: 1 /HPF (ref 0–5)

## 2019-07-24 PROCEDURE — 85025 COMPLETE CBC W/AUTO DIFF WBC: CPT

## 2019-07-24 PROCEDURE — 80307 DRUG TEST PRSMV CHEM ANLYZR: CPT

## 2019-07-24 PROCEDURE — 84443 ASSAY THYROID STIM HORMONE: CPT

## 2019-07-24 PROCEDURE — 80329 ANALGESICS NON-OPIOID 1 OR 2: CPT

## 2019-07-24 PROCEDURE — 93010 EKG 12-LEAD: ICD-10-PCS | Mod: ,,, | Performed by: INTERNAL MEDICINE

## 2019-07-24 PROCEDURE — 93010 ELECTROCARDIOGRAM REPORT: CPT | Mod: ,,, | Performed by: INTERNAL MEDICINE

## 2019-07-24 PROCEDURE — 80320 DRUG SCREEN QUANTALCOHOLS: CPT

## 2019-07-24 PROCEDURE — 81001 URINALYSIS AUTO W/SCOPE: CPT

## 2019-07-24 PROCEDURE — 99284 EMERGENCY DEPT VISIT MOD MDM: CPT

## 2019-07-24 PROCEDURE — 93005 ELECTROCARDIOGRAM TRACING: CPT

## 2019-07-24 PROCEDURE — 99284 EMERGENCY DEPT VISIT MOD MDM: CPT | Mod: ,,, | Performed by: EMERGENCY MEDICINE

## 2019-07-24 PROCEDURE — 80053 COMPREHEN METABOLIC PANEL: CPT

## 2019-07-24 PROCEDURE — 99284 PR EMERGENCY DEPT VISIT,LEVEL IV: ICD-10-PCS | Mod: ,,, | Performed by: EMERGENCY MEDICINE

## 2019-07-24 RX ORDER — HALOPERIDOL 5 MG/ML
5 INJECTION INTRAMUSCULAR
Status: DISCONTINUED | OUTPATIENT
Start: 2019-07-24 | End: 2019-07-25

## 2019-07-25 VITALS
DIASTOLIC BLOOD PRESSURE: 86 MMHG | BODY MASS INDEX: 30.36 KG/M2 | HEIGHT: 69 IN | HEART RATE: 98 BPM | RESPIRATION RATE: 18 BRPM | SYSTOLIC BLOOD PRESSURE: 128 MMHG | TEMPERATURE: 99 F | WEIGHT: 205 LBS | OXYGEN SATURATION: 96 %

## 2019-07-25 NOTE — ED NOTES
Bed: [facility] 01  Expected date: 7/24/19  Expected time: 5:28 PM  Means of arrival:   Comments:

## 2019-07-25 NOTE — ED NOTES
Pt is being discharged. All belongings given back to pt. Pt redressed in the restroom without difficulty

## 2019-07-25 NOTE — ED NOTES
Patient identifiers for Harman Tanner Jr. 44 y.o. male checked and correct.  Chief Complaint   Patient presents with    Delusional     OPC from  for schizophrenia delusions      Past Medical History:   Diagnosis Date    Bipolar 1 disorder     Depression     Lumbar herniated disc     Nausea & vomiting     Seizures     Vomiting      Allergies reported: Review of patient's allergies indicates:  No Known Allergies    Reports using pot 1-2x a week, but denies street drugs.     LOC: Patient is awake, alert, and aware of environment with an appropriate affect. Patient is oriented x 3. Patient does seem delusional and confused even though he speaks calmly like he is telling the truth; claims sister emptied his bank account. Paperwork with patient states that his sister reports he is schizophrenic and off his meds; that he was pulling up the carpet thinking there were bugs.   APPEARANCE: Patient resting comfortably and in no acute distress.   SKIN: The skin is warm and dry. Patient has normal skin turgor and moist mucus membranes. Tattoos to bilateral upper and lower extremities. Patient has healed scar to left calf.   MUSKULOSKELETAL: Patient is moving all extremities well, no obvious deformities noted. Pulses intact. Denies weakness.  RESPIRATORY: Airway is open and patent. Respirations are spontaneous and non-labored with normal effort and rate.  CARDIAC: Patient has a normal rate and rhythm. No peripheral edema noted.   ABDOMEN: No distention noted. Soft and non-tender upon palpation. Reports feeling nauseous sometimes; denies at this time.  NEUROLOGICAL: PERRL. Facial expression is symmetrical. Hand grasps are equal bilaterally. Normal sensation in all extremities when touched with finger. Denies headache.

## 2019-07-25 NOTE — ED TRIAGE NOTES
Patient brought to ED for psych evaluation after family member put in an order for protective custody. States patient is schizophrenic and off his meds; delusional stating people are in his bank account.
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
## 2020-11-16 ENCOUNTER — HOSPITAL ENCOUNTER (EMERGENCY)
Facility: HOSPITAL | Age: 45
Discharge: HOME | End: 2020-11-16
Attending: EMERGENCY MEDICINE
Payer: MEDICARE

## 2020-11-16 VITALS
DIASTOLIC BLOOD PRESSURE: 76 MMHG | SYSTOLIC BLOOD PRESSURE: 130 MMHG | TEMPERATURE: 98 F | HEART RATE: 86 BPM | OXYGEN SATURATION: 96 % | RESPIRATION RATE: 23 BRPM

## 2020-11-16 DIAGNOSIS — R10.9 ABDOMINAL PAIN, UNSPECIFIED ABDOMINAL LOCATION: Primary | ICD-10-CM

## 2020-11-16 DIAGNOSIS — R11.0 NAUSEA: ICD-10-CM

## 2020-11-16 DIAGNOSIS — R10.84 GENERALIZED ABDOMINAL PAIN: ICD-10-CM

## 2020-11-16 LAB — LACTATE SERPL-SCNC: 1 MMOL/L (ref 0.5–2.2)

## 2020-11-16 PROCEDURE — 99284 EMERGENCY DEPT VISIT MOD MDM: CPT | Mod: 25

## 2020-11-16 PROCEDURE — 25000003 PHARM REV CODE 250: Performed by: STUDENT IN AN ORGANIZED HEALTH CARE EDUCATION/TRAINING PROGRAM

## 2020-11-16 PROCEDURE — 96374 THER/PROPH/DIAG INJ IV PUSH: CPT

## 2020-11-16 PROCEDURE — 83605 ASSAY OF LACTIC ACID: CPT | Mod: 91

## 2020-11-16 PROCEDURE — 63600175 PHARM REV CODE 636 W HCPCS: Performed by: STUDENT IN AN ORGANIZED HEALTH CARE EDUCATION/TRAINING PROGRAM

## 2020-11-16 PROCEDURE — 99283 PR EMERGENCY DEPT VISIT,LEVEL III: ICD-10-PCS | Mod: ,,, | Performed by: SURGERY

## 2020-11-16 PROCEDURE — 99283 EMERGENCY DEPT VISIT LOW MDM: CPT | Mod: ,,, | Performed by: SURGERY

## 2020-11-16 PROCEDURE — 96361 HYDRATE IV INFUSION ADD-ON: CPT

## 2020-11-16 RX ORDER — ONDANSETRON 2 MG/ML
4 INJECTION INTRAMUSCULAR; INTRAVENOUS
Status: COMPLETED | OUTPATIENT
Start: 2020-11-16 | End: 2020-11-16

## 2020-11-16 RX ADMIN — ONDANSETRON 4 MG: 2 INJECTION, SOLUTION INTRAMUSCULAR; INTRAVENOUS at 04:11

## 2020-11-16 RX ADMIN — SODIUM CHLORIDE 1000 ML: 0.9 INJECTION, SOLUTION INTRAVENOUS at 02:11

## 2020-11-16 NOTE — ASSESSMENT & PLAN NOTE
44 yo M presents as transfer for surgical evaluation of diffuse abdominal pain. Patient with unremarkable imaging but significant lactic acidosis. Exam relatively benign. Symptoms most consistent with alcohol withdraw, given his history.  - Imaging further reviewed with in house radiologist, no acute abnormalities identified.  - Lactic acidosis down trending on repeat labs in ED  - No surgical intervention indicated at this time.   - If patient requires admission surgery will be available for any additional consulting needs

## 2020-11-16 NOTE — ED PROVIDER NOTES
Encounter Date: 11/16/2020       History     Chief Complaint   Patient presents with    transfer     ED to ED transfer from [facility] for possible ischemic bowel.      Mr. Tanner is a 45 y.o. male with PMH of bipolar, alcohol abuse, depression, long-term opiate use, and seizures who presents as an ED-to-ED transfer for abdominal pain concerning for mesenteric ischemia. The abdominal pain is new x3 days, constant, not aggravated by touch, no relieving factors, and associated with nausea and vomiting. At the prior ED he was found to have an elevated lactate >7, that downtrended following IV fluids to 4. He reports he lost consciousness this morning and thinks he had a seizure possibly. CT Abdomen was performed at prior ED. He reports dizziness, headache, generalized weakness, sweating, chills, and a dull chest pain.     Hx of appendectomy    The history is provided by the patient and medical records.     Review of patient's allergies indicates:  No Known Allergies  Past Medical History:   Diagnosis Date    Bipolar 1 disorder     Depression     Lumbar herniated disc     Nausea & vomiting     Seizures     Vomiting      Past Surgical History:   Procedure Laterality Date    APPENDECTOMY      BACK SURGERY      cervical    CERVICAL FUSION      HERNIA REPAIR      LEG SURGERY Left     for compartment syndrome    LEG SURGERY      SHOULDER SURGERY Bilateral     TONSILLECTOMY       Family History   Problem Relation Age of Onset    Stroke Father      Social History     Tobacco Use    Smoking status: Never Smoker    Smokeless tobacco: Never Used   Substance Use Topics    Alcohol use: Yes     Comment: 2 beers earlier today    Drug use: Yes     Frequency: 3.0 times per week     Types: Marijuana     Comment: opiods     Review of Systems   Constitutional: Positive for chills and diaphoresis. Negative for fever.   HENT: Negative for congestion and sore throat.    Eyes: Negative for pain and visual disturbance.    Respiratory: Negative for cough and shortness of breath.    Cardiovascular: Positive for chest pain. Negative for leg swelling.   Gastrointestinal: Positive for abdominal pain. Negative for constipation, diarrhea, nausea and vomiting.   Endocrine: Negative for polydipsia and polyuria.   Genitourinary: Negative for dysuria and hematuria.   Musculoskeletal: Negative for arthralgias and back pain.   Neurological: Positive for dizziness, seizures and syncope. Negative for weakness, numbness and headaches.       Physical Exam     Initial Vitals [11/16/20 1246]   BP Pulse Resp Temp SpO2   (!) 152/93 96 18 99.1 °F (37.3 °C) 98 %      MAP       --         Physical Exam    Nursing note and vitals reviewed.  Constitutional: He appears well-developed and well-nourished. He is not diaphoretic. No distress.   Pt states he's very uncomfortable and in pain   HENT:   Head: Normocephalic and atraumatic.   Eyes: Conjunctivae and EOM are normal. Pupils are equal, round, and reactive to light.   Neck: Normal range of motion. Neck supple.   Cardiovascular: Normal rate, regular rhythm, normal heart sounds and intact distal pulses.   No murmur heard.  Pulmonary/Chest: Breath sounds normal. No respiratory distress. He has no wheezes. He has no rales.   Abdominal: Soft. He exhibits no distension. There is no abdominal tenderness. There is no rebound and no guarding.   No tenderness to deep palpation of abdomen in any quadrant, though patient reports abdominal pain   Musculoskeletal: Normal range of motion. No tenderness or edema.   Neurological: He is alert and oriented to person, place, and time. He has normal strength. No sensory deficit.   Skin: Skin is warm and dry. Capillary refill takes less than 2 seconds.         ED Course   Procedures  Labs Reviewed   LACTIC ACID, PLASMA          Imaging Results    None          Medical Decision Making:   History:   Old Medical Records: I decided to obtain old medical records.  Old Records  Summarized: records from another hospital.  Initial Assessment:   45 y.o. male with PMH of bipolar, alcohol abuse, depression, long-term opiate use, and seizures presents with 10/10 abdominal pain, stable vitals, no PE findings on abdominal exam  Differential Diagnosis:   Opioid withdrawal, alcohol withdrawal, mesenteric ischemia, pancreatitis  Clinical Tests:   Lab Tests: Reviewed  Radiological Study: Reviewed  Medical Tests: Reviewed  ED Management:  I spoke with Dr. Ledbetter who stated that the patient does not need surgery at this time.  He reviewed the CT scan with Radiology and there is no evidence of bowel ischemia or edema.  Further, the patient's lactate has been downtrending with fluids.  I will give another 1 L of normal saline and repeat a lactate to confirm it is downtrending status.  Patient's lipase was negative and I do not suspect pancreatitis.  If repeat lactate continues to downtrend, the patient can be discharged with PCP follow-up.     4:56 PM  Patient reported nausea for which I gave zofran 4mg with improvement. Patient's lactate now normalized. He will be discharged at this time. Patient agrees with and is comfortable with the plan.                              Clinical Impression:     ICD-10-CM ICD-9-CM   1. Abdominal pain, unspecified abdominal location  R10.9 789.00   2. Nausea  R11.0 787.02                          ED Disposition Condition    Discharge Stable        ED Prescriptions     None        Follow-up Information     Follow up With Specialties Details Why Contact Info    Kingsley Wallace MD General Practice Schedule an appointment as soon as possible for a visit in 1 week to discuss your recent ER visit and any continued concerns you have [address] [place] [address]   [address] [place] [id number]  [phone]                                         Alexis Lucero MD  Resident  11/16/20 4239

## 2020-11-16 NOTE — CONSULTS
[facility]  General Surgery  Consult Note    Patient Name: Harman Tanner Jr.  MRN: [id number]  Code Status: Prior  Admission Date: 11/16/2020  Hospital Length of Stay: 0 days  Attending Physician: No att. providers found  Primary Care Provider: Kingsley Wallace MD    Patient information was obtained from patient, past medical records and ER records.     Consults  Subjective:     Principal Problem: Generalized abdominal pain    History of Present Illness: 46 yo M with history of alcohol use disorder (history of multiple withdraws with seizure), substance abuse, opioid abuse who presented to outside ED with complaints of one day history of severe abdominal pain. He reports that this morning he began to experience diffuse sharp and cramping abdominal pain. Pain does not radiate elsewhere, he denies similar pain in the past. He denies nausea or vomiting, having normal bowel movements without constipation or diarrhea. He denies fevers or chills but has been feeling weak and fatigued this morning. Imaging showed no acute abnormalities but he did have a significant lactic acidosis of 4.7. Patient was transferred to [place] for surgical evaluation. At time of evaluation in ED patient reported that pain had improved some; he states that he feels like he is withdrawing from alcohol and that this is how he had felt in the past. Previous abdominal surgeries include appendectomy and hernia repair both as a child.    Current Facility-Administered Medications on File Prior to Encounter   Medication    [COMPLETED] famotidine (PF) injection 20 mg    [COMPLETED] hydromorphone (PF) injection 1 mg    [COMPLETED] iohexoL (OMNIPAQUE 350) injection 75 mL    [COMPLETED] lactated ringers bolus 1,000 mL    [COMPLETED] lorazepam injection 2 mg    [COMPLETED] morphine injection 4 mg    [COMPLETED] ondansetron injection 4 mg    [COMPLETED] sodium chloride 0.9% bolus 1,000 mL     Current Outpatient Medications on  File Prior to Encounter   Medication Sig    clonazePAM (KLONOPIN) 1 MG tablet Take 1 mg by mouth 2 (two) times daily as needed for Anxiety.    diclofenac (VOLTAREN) 75 MG EC tablet Take 1 tablet (75 mg total) by mouth 2 (two) times daily.    metoprolol succinate (TOPROL-XL) 100 MG 24 hr tablet Take 100 mg by mouth once daily.    oxyCODONE-acetaminophen (PERCOCET)  mg per tablet Take 1 tablet by mouth every 4 (four) hours as needed for Pain.    pantoprazole (PROTONIX) 20 MG tablet Take 20 mg by mouth once daily.       Review of patient's allergies indicates:  No Known Allergies    Past Medical History:   Diagnosis Date    Bipolar 1 disorder     Depression     Lumbar herniated disc     Nausea & vomiting     Seizures     Vomiting      Past Surgical History:   Procedure Laterality Date    APPENDECTOMY      BACK SURGERY      cervical    CERVICAL FUSION      HERNIA REPAIR      LEG SURGERY Left     for compartment syndrome    LEG SURGERY      SHOULDER SURGERY Bilateral     TONSILLECTOMY       Family History     Problem Relation (Age of Onset)    Stroke Father        Tobacco Use    Smoking status: Never Smoker    Smokeless tobacco: Never Used   Substance and Sexual Activity    Alcohol use: Yes     Comment: 2 beers earlier today    Drug use: Yes     Frequency: 3.0 times per week     Types: Marijuana     Comment: opiods    Sexual activity: Yes     Partners: Female     Review of Systems   Constitutional: Positive for diaphoresis and fatigue. Negative for chills and fever.   HENT: Negative for trouble swallowing and voice change.    Respiratory: Negative for cough and shortness of breath.    Cardiovascular: Negative for chest pain and palpitations.   Gastrointestinal: Positive for abdominal pain. Negative for abdominal distention, constipation, diarrhea, nausea and vomiting.   Genitourinary: Negative for difficulty urinating and dysuria.   Musculoskeletal: Negative for back pain and gait problem.    Neurological: Positive for weakness. Negative for dizziness.     Objective:     Vital Signs (Most Recent):  Temp: 98.1 °F (36.7 °C) (11/16/20 1327)  Pulse: 86 (11/16/20 1658)  Resp: (!) 23 (11/16/20 1658)  BP: 130/76 (11/16/20 1641)  SpO2: 96 % (11/16/20 1658) Vital Signs (24h Range):  Temp:  [97.7 °F (36.5 °C)-99.1 °F (37.3 °C)] 98.1 °F (36.7 °C)  Pulse:  [] 86  Resp:  [14-24] 23  SpO2:  [95 %-100 %] 96 %  BP: (124-166)/() 130/76        There is no height or weight on file to calculate BMI.    Physical Exam  Vitals signs reviewed.   Constitutional:       Appearance: He is normal weight. He is diaphoretic.   HENT:      Head: Normocephalic and atraumatic.   Eyes:      General: No scleral icterus.     Extraocular Movements: Extraocular movements intact.   Neck:      Musculoskeletal: Normal range of motion and neck supple.   Cardiovascular:      Rate and Rhythm: Normal rate.      Pulses: Normal pulses.   Pulmonary:      Effort: Pulmonary effort is normal. No respiratory distress.   Abdominal:      General: Abdomen is flat. There is no distension.      Palpations: Abdomen is soft.      Tenderness: There is abdominal tenderness (minimally tender across entire abdomen, non-peritonitic, distractable). There is no guarding or rebound.   Musculoskeletal: Normal range of motion.         General: No swelling.   Skin:     General: Skin is warm.   Neurological:      General: No focal deficit present.      Mental Status: He is alert and oriented to person, place, and time.         Significant Labs:  CBC:   Recent Labs   Lab 11/16/20  0931   WBC 4.78   RBC 4.63   HGB 14.0   HCT 41.3   *   MCV 89   MCH 30.2   MCHC 33.9     CMP:   Recent Labs   Lab 11/16/20  0931   GLU 88   CALCIUM 9.0   ALBUMIN 4.9   PROT 7.5   *   K 4.0   CO2 19*   CL 96   BUN 12   CREATININE 0.78   ALKPHOS 72   *   *   BILITOT 1.1*       Significant Diagnostics:  I have reviewed all pertinent imaging results/findings  within the past 24 hours.       Assessment/Plan:     * Generalized abdominal pain  44 yo M presents as transfer for surgical evaluation of diffuse abdominal pain. Patient with unremarkable imaging but significant lactic acidosis. Exam relatively benign. Symptoms most consistent with alcohol withdraw, given his history.  - Imaging further reviewed with in house radiologist, no acute abnormalities identified.  - Lactic acidosis down trending on repeat labs in ED  - No surgical intervention indicated at this time.   - If patient requires admission surgery will be available for any additional consulting needs      VTE Risk Mitigation (From admission, onward)    None          Thank you for your consult. I will sign off. Please contact us if you have any additional questions.    Iva High MD  General Surgery  [facility]

## 2020-11-16 NOTE — Clinical Note
"Harman "Jasper Tanner was seen and treated in our emergency department on 11/16/2020.  He may return to work on 11/17/2020.       If you have any questions or concerns, please don't hesitate to call.      Alexis Lucero MD"

## 2020-11-16 NOTE — HPI
46 yo M with history of alcohol use disorder (history of multiple withdraws with seizure), substance abuse, opioid abuse who presented to outside ED with complaints of one day history of severe abdominal pain. He reports that this morning he began to experience diffuse sharp and cramping abdominal pain. Pain does not radiate elsewhere, he denies similar pain in the past. He denies nausea or vomiting, having normal bowel movements without constipation or diarrhea. He denies fevers or chills but has been feeling weak and fatigued this morning. Imaging showed no acute abnormalities but he did have a significant lactic acidosis of 4.7. Patient was transferred to [place] for surgical evaluation. At time of evaluation in ED patient reported that pain had improved some; he states that he feels like he is withdrawing from alcohol and that this is how he had felt in the past. Previous abdominal surgeries include appendectomy and hernia repair both as a child.

## 2020-11-16 NOTE — SUBJECTIVE & OBJECTIVE
Current Facility-Administered Medications on File Prior to Encounter   Medication    [COMPLETED] famotidine (PF) injection 20 mg    [COMPLETED] hydromorphone (PF) injection 1 mg    [COMPLETED] iohexoL (OMNIPAQUE 350) injection 75 mL    [COMPLETED] lactated ringers bolus 1,000 mL    [COMPLETED] lorazepam injection 2 mg    [COMPLETED] morphine injection 4 mg    [COMPLETED] ondansetron injection 4 mg    [COMPLETED] sodium chloride 0.9% bolus 1,000 mL     Current Outpatient Medications on File Prior to Encounter   Medication Sig    clonazePAM (KLONOPIN) 1 MG tablet Take 1 mg by mouth 2 (two) times daily as needed for Anxiety.    diclofenac (VOLTAREN) 75 MG EC tablet Take 1 tablet (75 mg total) by mouth 2 (two) times daily.    metoprolol succinate (TOPROL-XL) 100 MG 24 hr tablet Take 100 mg by mouth once daily.    oxyCODONE-acetaminophen (PERCOCET)  mg per tablet Take 1 tablet by mouth every 4 (four) hours as needed for Pain.    pantoprazole (PROTONIX) 20 MG tablet Take 20 mg by mouth once daily.       Review of patient's allergies indicates:  No Known Allergies    Past Medical History:   Diagnosis Date    Bipolar 1 disorder     Depression     Lumbar herniated disc     Nausea & vomiting     Seizures     Vomiting      Past Surgical History:   Procedure Laterality Date    APPENDECTOMY      BACK SURGERY      cervical    CERVICAL FUSION      HERNIA REPAIR      LEG SURGERY Left     for compartment syndrome    LEG SURGERY      SHOULDER SURGERY Bilateral     TONSILLECTOMY       Family History     Problem Relation (Age of Onset)    Stroke Father        Tobacco Use    Smoking status: Never Smoker    Smokeless tobacco: Never Used   Substance and Sexual Activity    Alcohol use: Yes     Comment: 2 beers earlier today    Drug use: Yes     Frequency: 3.0 times per week     Types: Marijuana     Comment: opiods    Sexual activity: Yes     Partners: Female     Review of Systems   Constitutional:  Positive for diaphoresis and fatigue. Negative for chills and fever.   HENT: Negative for trouble swallowing and voice change.    Respiratory: Negative for cough and shortness of breath.    Cardiovascular: Negative for chest pain and palpitations.   Gastrointestinal: Positive for abdominal pain. Negative for abdominal distention, constipation, diarrhea, nausea and vomiting.   Genitourinary: Negative for difficulty urinating and dysuria.   Musculoskeletal: Negative for back pain and gait problem.   Neurological: Positive for weakness. Negative for dizziness.     Objective:     Vital Signs (Most Recent):  Temp: 98.1 °F (36.7 °C) (11/16/20 1327)  Pulse: 86 (11/16/20 1658)  Resp: (!) 23 (11/16/20 1658)  BP: 130/76 (11/16/20 1641)  SpO2: 96 % (11/16/20 1658) Vital Signs (24h Range):  Temp:  [97.7 °F (36.5 °C)-99.1 °F (37.3 °C)] 98.1 °F (36.7 °C)  Pulse:  [] 86  Resp:  [14-24] 23  SpO2:  [95 %-100 %] 96 %  BP: (124-166)/() 130/76        There is no height or weight on file to calculate BMI.    Physical Exam  Vitals signs reviewed.   Constitutional:       Appearance: He is normal weight. He is diaphoretic.   HENT:      Head: Normocephalic and atraumatic.   Eyes:      General: No scleral icterus.     Extraocular Movements: Extraocular movements intact.   Neck:      Musculoskeletal: Normal range of motion and neck supple.   Cardiovascular:      Rate and Rhythm: Normal rate.      Pulses: Normal pulses.   Pulmonary:      Effort: Pulmonary effort is normal. No respiratory distress.   Abdominal:      General: Abdomen is flat. There is no distension.      Palpations: Abdomen is soft.      Tenderness: There is abdominal tenderness (minimally tender across entire abdomen, non-peritonitic, distractable). There is no guarding or rebound.   Musculoskeletal: Normal range of motion.         General: No swelling.   Skin:     General: Skin is warm.   Neurological:      General: No focal deficit present.      Mental Status: He  is alert and oriented to person, place, and time.         Significant Labs:  CBC:   Recent Labs   Lab 11/16/20  0931   WBC 4.78   RBC 4.63   HGB 14.0   HCT 41.3   *   MCV 89   MCH 30.2   MCHC 33.9     CMP:   Recent Labs   Lab 11/16/20  0931   GLU 88   CALCIUM 9.0   ALBUMIN 4.9   PROT 7.5   *   K 4.0   CO2 19*   CL 96   BUN 12   CREATININE 0.78   ALKPHOS 72   *   *   BILITOT 1.1*       Significant Diagnostics:  I have reviewed all pertinent imaging results/findings within the past 24 hours.

## 2020-11-16 NOTE — ED NOTES
APPEARANCE: Alert, oriented and unkempt appearance.  CARDIAC: Normal rate and rhythm, no murmur heard.   PERIPHERAL VASCULAR: peripheral pulses present. Normal cap refill. No edema. Warm to touch.    RESPIRATORY:Normal rate and effort, breath sounds clear bilaterally throughout chest. Respirations are equal and unlabored no obvious signs of distress.  GASTRO: soft, bowel sounds normal, no tenderness, no abdominal distention.  MUSC: Limited ROM due to weakness. No bony tenderness or soft tissue tenderness. No obvious deformity.  SKIN: Skin is warm and dry, normal skin turgor, mucous membranes moist.  NEURO: 5/5 strength major flexors/extensors bilaterally. Sensory intact to light touch bilaterally. [place] coma scale: eyes open spontaneously-4, oriented & converses-5, obeys commands-6. No neurological abnormalities.   MENTAL STATUS: awake, alert and aware of environment.  EYE: PERRL, both eyes: pupils brisk and reactive to light. Normal size.  ENT: EARS: no obvious drainage. NOSE: no active bleeding.   Pt is a transfer from [facility]. Pt reports he is in DT's from alcohol withdrawal.

## 2020-11-16 NOTE — ED NOTES
Pt states he doesn't want to leave. Pt trying to ask for more pain medication. Dr. Lucero refusing to give more pain medication. Pt dc with discharge instructions. Pt wheeled out to waiting room to wait for his friend whom he called for a ride.

## 2020-11-16 NOTE — ED NOTES
Pt states he is in withdrawals and needs pain medication or withdrawal medication. Dr. Lucero in to speak to patient.

## 2023-09-26 NOTE — DISCHARGE INSTRUCTIONS
It was a pleasure taking care of you today!     Please follow-up with your PCP to discuss your recent ER visit and any additional concerns to have.    Please return to the ER if you experience abdominal pain associated with repeated episodes of vomiting, inability to drink fluids without vomiting, or pain intractable to home medications.   Spoke with Mr Nam. Stated he had been short of breath since last night. When I spoke with him, his breathing sounded labored and he sounded SOB. He states no weight gain and is compliant with Lasix. He has not done any activity this morning due to SOB and is currently just sitting down.    Discussed with MARSHALL Luther. Called patient back and told him to go to ED when we hung up so he can get a full evaluation.    He verbalized understanding.    Julie Haase RN  CTS Nurse Navigator [phone]        ----- Message from Arthur Perkins sent at 9/26/2023  9:32 AM CDT -----  Regarding: Advice  Contact: [phone]  Patient is calling to speak with someone in office due to he is having breathing problems. Please contact pt

## (undated) DEVICE — SEE MEDLINE ITEM 156930

## (undated) DEVICE — NDL N SERIES MICRO-DISSECTION

## (undated) DEVICE — CORD BIPOLAR ELECTROSURGICAL

## (undated) DEVICE — SUT 5/0 18IN PLAIN FAST AB

## (undated) DEVICE — STAPLER SKIN ROTATING HEAD

## (undated) DEVICE — POSITIONER IV ARMBOARD FOAM

## (undated) DEVICE — CLOSURE SKIN STERI STRIP 1/2X4

## (undated) DEVICE — SOL NS 1000CC

## (undated) DEVICE — PACK EYE

## (undated) DEVICE — DRESSING EYE OVAL LF

## (undated) DEVICE — FORCEP BIPOLAR ADSON 1MM TIP

## (undated) DEVICE — UNDERGLOVES BIOGEL PI SZ 7 LF

## (undated) DEVICE — GLOVE BIOGEL SKINSENSE PI 6.5

## (undated) DEVICE — SEE MEDLINE ITEM 157166

## (undated) DEVICE — SEE MEDLINE ITEM 157110

## (undated) DEVICE — UNDERGLOVE BIOGEL PI SZ 6.5 LF

## (undated) DEVICE — POSITIONER HEAD DONUT 9IN FOAM

## (undated) DEVICE — GOWN SMART IMP BREATHABLE XXLG

## (undated) DEVICE — TRAY DRY SKIN SCRUB PREP

## (undated) DEVICE — PROTECTOR CORNEAL CROUCH ST

## (undated) DEVICE — ELECTRODE REM PLYHSV RETURN 9